# Patient Record
Sex: FEMALE | Race: WHITE | NOT HISPANIC OR LATINO | URBAN - METROPOLITAN AREA
[De-identification: names, ages, dates, MRNs, and addresses within clinical notes are randomized per-mention and may not be internally consistent; named-entity substitution may affect disease eponyms.]

---

## 2022-05-31 ENCOUNTER — TELEPHONE (OUTPATIENT)
Dept: CARDIOLOGY | Facility: CLINIC | Age: 57
End: 2022-05-31
Payer: COMMERCIAL

## 2022-05-31 ENCOUNTER — PRE VISIT (OUTPATIENT)
Dept: CARDIOLOGY | Facility: CLINIC | Age: 57
End: 2022-05-31

## 2022-05-31 NOTE — TELEPHONE ENCOUNTER
Called pt, states she is coming in for high BP issues as her dad had 4 strokes & she is concerned. She is looking for more of a preventative visit. Pt was referred by a pt of Dr. Hi. Pt sees a PCP w/ [facility] & will have records faxed. Will try to access [facility] Everywhere also. Penny MADERA

## 2022-06-02 ENCOUNTER — OFFICE VISIT (OUTPATIENT)
Dept: CARDIOLOGY | Facility: CLINIC | Age: 57
End: 2022-06-02
Payer: COMMERCIAL

## 2022-06-02 VITALS
WEIGHT: 164 LBS | HEART RATE: 75 BPM | SYSTOLIC BLOOD PRESSURE: 133 MMHG | BODY MASS INDEX: 24.86 KG/M2 | OXYGEN SATURATION: 99 % | DIASTOLIC BLOOD PRESSURE: 84 MMHG | HEIGHT: 68 IN

## 2022-06-02 DIAGNOSIS — I10 ESSENTIAL HYPERTENSION: ICD-10-CM

## 2022-06-02 DIAGNOSIS — I15.0 RENOVASCULAR HYPERTENSION: Primary | ICD-10-CM

## 2022-06-02 PROCEDURE — 93000 ELECTROCARDIOGRAM COMPLETE: CPT | Performed by: INTERNAL MEDICINE

## 2022-06-02 PROCEDURE — 99204 OFFICE O/P NEW MOD 45 MIN: CPT | Performed by: INTERNAL MEDICINE

## 2022-06-02 RX ORDER — HYDROCHLOROTHIAZIDE 25 MG/1
25 TABLET ORAL DAILY
COMMUNITY

## 2022-06-02 RX ORDER — LISINOPRIL 20 MG/1
20 TABLET ORAL DAILY
COMMUNITY

## 2022-06-02 RX ORDER — BUPROPION HYDROCHLORIDE 150 MG/1
150 TABLET ORAL EVERY MORNING
COMMUNITY

## 2022-06-02 RX ORDER — OMEPRAZOLE 10 MG/1
20 CAPSULE, DELAYED RELEASE ORAL DAILY
COMMUNITY

## 2022-06-02 NOTE — PROGRESS NOTES
Service Date: 06/02/2022    PRIMARY CARE PHYSICIAN:  None listed.  This was a self-referral.    HISTORY OF PRESENT ILLNESS:  It is my pleasure today to see Clarice Guevara, who is a 56-year-old patient who on 12/26 contracted COVID-19.  She then noticed in January that her blood pressure was poorly controlled.  She eventually was placed on hydrochlorothiazide 25 mg per day and lisinopril 20 mg per day.  This appears to be controlling her blood pressure well.  She has a strong family history of essential hypertension.  Her father had essential hypertension and suffered a stroke.  Her brother and her sister both have essential hypertension.  But interestingly, prior to her COVID, her blood pressure was very well controlled with no issues.  So this is a relatively short onset of high blood pressure.  She did have a basic metabolic profile performed on 01/05, and this showed that her potassium was normal at 4.6, which is strong evidence against Conn syndrome as a cause of hypertension.  Her renal function was also normal.  Glucose was 105, but I do not know if this was fasting or not.    The patient is not complaining of any chest pains, chest pressure or shortness of breath.  She has had no neurological symptoms.  She tells me that her blood pressure could get up into the 180s.    Her 12-lead electrocardiogram shows an RSR prime pattern in V1, which is a normal variant.  Overall, her EKG is normal, though the computer said otherwise.  There is no evidence of criteria for left ventricular hypertrophy on the EKG.    IMPRESSION:    1.  Essential hypertension.  This is a relatively quick onset, and we certainly do need to rule out secondary causes of hypertension and in particular in women, fibromuscular dysplasia needs to be ruled out.  However, this patient has contrast allergy.  In fact, she had anaphylaxis to contrast, so I will do an MRA of her renal arteries to see if there is evidence of FMD.  2.  I will also do a  24-hour urine collection for metanephrines, VMA and catecholamines to determine if there is any evidence of a pheochromocytoma.  I will have the patient return with the results of those tests.  I will continue her on her very good present medical medications.    It is my pleasure to be involved in the care of this nice patient.  As always, she has been told to contact me if she has any questions or any concerns.    Jose Stanford MD, [facility]        D: 2022   T: 2022   MT: erika    Name:     THI MILANTom  MRN:      [id number]        Account:      [id number]   :      1965           Service Date: 2022       Document: [id number]

## 2022-06-02 NOTE — LETTER
6/2/2022    Physician No Ref-Primary  No address on file    RE: Clarice Guevara       Dear Colleague,     I had the pleasure of seeing Clarice Guevara in the [facility].  HPI and Plan:   See dictation          Orders Placed This Encounter   Procedures     MRA Abdomen w Contrast Angiogram     Metanephrine random or 24 hr urine     HVA VMA URINE     Catecholamines fractioned free urine     Follow-Up with Cardiology     EKG 12-lead complete w/read - Clinics (performed today)       Orders Placed This Encounter   Medications     omeprazole (PRILOSEC) 10 MG DR capsule     Sig: Take 20 mg by mouth daily     buPROPion (WELLBUTRIN XL) 150 MG 24 hr tablet     Sig: Take 150 mg by mouth every morning     lisinopril (ZESTRIL) 20 MG tablet     Sig: Take 20 mg by mouth daily     hydrochlorothiazide (HYDRODIURIL) 25 MG tablet     Sig: Take 25 mg by mouth daily       There are no discontinued medications.      Encounter Diagnoses   Name Primary?     Renovascular hypertension Yes     Essential hypertension        CURRENT MEDICATIONS:  Current Outpatient Medications   Medication Sig Dispense Refill     buPROPion (WELLBUTRIN XL) 150 MG 24 hr tablet Take 150 mg by mouth every morning       hydrochlorothiazide (HYDRODIURIL) 25 MG tablet Take 25 mg by mouth daily       lisinopril (ZESTRIL) 20 MG tablet Take 20 mg by mouth daily       omeprazole (PRILOSEC) 10 MG DR capsule Take 20 mg by mouth daily         ALLERGIES     Allergies   Allergen Reactions     Contrast Dye Anaphylaxis       PAST MEDICAL HISTORY:  No past medical history on file.    PAST SURGICAL HISTORY:  No past surgical history on file.    FAMILY HISTORY:  No family history on file.    SOCIAL HISTORY:  Social History     Socioeconomic History     Marital status:        Review of Systems:  Skin:          Eyes:         ENT:         Respiratory:          Cardiovascular:         Gastroenterology:        Genitourinary:         Musculoskeletal:        "  Neurologic:         Psychiatric:         Heme/Lymph/Imm:         Endocrine:           Physical Exam:  Vitals: /84   Pulse 75   Ht 1.727 m (5' 8\")   Wt 74.4 kg (164 lb)   SpO2 99%   BMI 24.94 kg/m      Constitutional:  cooperative, alert and oriented, well developed, well nourished, in no acute distress        Skin:  warm and dry to the touch          Head:  normocephalic        Eyes:  pupils equal and round        Lymph:      ENT:  no pallor or cyanosis        Neck:  carotid pulses are full and equal bilaterally, JVP normal, no carotid bruit        Respiratory:  normal breath sounds, clear to auscultation, normal A-P diameter, normal symmetry, normal respiratory excursion, no use of accessory muscles         Cardiac: regular rhythm, normal S1/S2, no S3 or S4, apical impulse not displaced, no murmurs, gallops or rubs                pulses full and equal, no bruits auscultated                                        GI:  not assessed this visit        Extremities and Muscular Skeletal:  no deformities, clubbing, cyanosis, erythema observed;no edema              Neurological:  no gross motor deficits;affect appropriate        Psych:  Alert and Oriented x 3        CC  Referred Self, MD  No address on file                  Thank you for allowing me to participate in the care of your patient.      Sincerely,     Jose Hi MD, MD     [facility]  cc:   Rodney Funez MD  No address on file        "

## 2022-06-02 NOTE — PROGRESS NOTES
"HPI and Plan:   See dictation          Orders Placed This Encounter   Procedures     MRA Abdomen w Contrast Angiogram     Metanephrine random or 24 hr urine     HVA VMA URINE     Catecholamines fractioned free urine     Follow-Up with Cardiology     EKG 12-lead complete w/read - Clinics (performed today)       Orders Placed This Encounter   Medications     omeprazole (PRILOSEC) 10 MG DR capsule     Sig: Take 20 mg by mouth daily     buPROPion (WELLBUTRIN XL) 150 MG 24 hr tablet     Sig: Take 150 mg by mouth every morning     lisinopril (ZESTRIL) 20 MG tablet     Sig: Take 20 mg by mouth daily     hydrochlorothiazide (HYDRODIURIL) 25 MG tablet     Sig: Take 25 mg by mouth daily       There are no discontinued medications.      Encounter Diagnoses   Name Primary?     Renovascular hypertension Yes     Essential hypertension        CURRENT MEDICATIONS:  Current Outpatient Medications   Medication Sig Dispense Refill     buPROPion (WELLBUTRIN XL) 150 MG 24 hr tablet Take 150 mg by mouth every morning       hydrochlorothiazide (HYDRODIURIL) 25 MG tablet Take 25 mg by mouth daily       lisinopril (ZESTRIL) 20 MG tablet Take 20 mg by mouth daily       omeprazole (PRILOSEC) 10 MG DR capsule Take 20 mg by mouth daily         ALLERGIES     Allergies   Allergen Reactions     Contrast Dye Anaphylaxis       PAST MEDICAL HISTORY:  No past medical history on file.    PAST SURGICAL HISTORY:  No past surgical history on file.    FAMILY HISTORY:  No family history on file.    SOCIAL HISTORY:  Social History     Socioeconomic History     Marital status:        Review of Systems:  Skin:          Eyes:         ENT:         Respiratory:          Cardiovascular:         Gastroenterology:        Genitourinary:         Musculoskeletal:         Neurologic:         Psychiatric:         Heme/Lymph/Imm:         Endocrine:           Physical Exam:  Vitals: /84   Pulse 75   Ht 1.727 m (5' 8\")   Wt 74.4 kg (164 lb)   SpO2 99%   " BMI 24.94 kg/m      Constitutional:  cooperative, alert and oriented, well developed, well nourished, in no acute distress        Skin:  warm and dry to the touch          Head:  normocephalic        Eyes:  pupils equal and round        Lymph:      ENT:  no pallor or cyanosis        Neck:  carotid pulses are full and equal bilaterally, JVP normal, no carotid bruit        Respiratory:  normal breath sounds, clear to auscultation, normal A-P diameter, normal symmetry, normal respiratory excursion, no use of accessory muscles         Cardiac: regular rhythm, normal S1/S2, no S3 or S4, apical impulse not displaced, no murmurs, gallops or rubs                pulses full and equal, no bruits auscultated                                        GI:  not assessed this visit        Extremities and Muscular Skeletal:  no deformities, clubbing, cyanosis, erythema observed;no edema              Neurological:  no gross motor deficits;affect appropriate        Psych:  Alert and Oriented x 3        CC  Referred Self, MD  No address on file

## 2022-06-02 NOTE — LETTER
Date:Arminda 3, 2022      Patient was self referred, no letter generated. Do not send.        [facility]

## 2022-09-29 ENCOUNTER — TELEPHONE (OUTPATIENT)
Dept: CARDIOLOGY | Facility: CLINIC | Age: 57
End: 2022-09-29

## 2022-09-29 NOTE — TELEPHONE ENCOUNTER
----- Message from Dawna Jesús sent at 9/28/2022 12:26 PM CDT -----  Regarding: FW: Appointment  Please call pt- she wants to know WHY she needs an MRA  I need orders extended for that    Thanks  Dawna   ----- Message -----  From: Zuri Johnson LPN  Sent: 9/28/2022   9:59 AM CDT  To: [facility] Scheduling  Subject: Appointment                                      Patient would like to cancel appointment with Dr. Harvinder Stanford on 10/03/22 at 9:45 am. Please cancel appointment and call patient to reschedule.      Thank you,    Zuri Johnson